# Patient Record
Sex: FEMALE | Race: BLACK OR AFRICAN AMERICAN | Employment: UNEMPLOYED | URBAN - METROPOLITAN AREA
[De-identification: names, ages, dates, MRNs, and addresses within clinical notes are randomized per-mention and may not be internally consistent; named-entity substitution may affect disease eponyms.]

---

## 2022-08-29 ENCOUNTER — HOSPITAL ENCOUNTER (INPATIENT)
Age: 22
LOS: 1 days | Discharge: ANOTHER HOSPITAL | DRG: 561 | End: 2022-08-29
Attending: OBSTETRICS & GYNECOLOGY | Admitting: OBSTETRICS & GYNECOLOGY
Payer: MEDICAID

## 2022-08-29 ENCOUNTER — HOSPITAL ENCOUNTER (EMERGENCY)
Age: 22
Discharge: ANOTHER HOSPITAL | End: 2022-08-29
Attending: EMERGENCY MEDICINE
Payer: MEDICAID

## 2022-08-29 VITALS
DIASTOLIC BLOOD PRESSURE: 58 MMHG | HEART RATE: 91 BPM | HEIGHT: 65 IN | BODY MASS INDEX: 33.32 KG/M2 | RESPIRATION RATE: 18 BRPM | TEMPERATURE: 97.7 F | OXYGEN SATURATION: 100 % | WEIGHT: 200 LBS | SYSTOLIC BLOOD PRESSURE: 111 MMHG

## 2022-08-29 VITALS
OXYGEN SATURATION: 100 % | SYSTOLIC BLOOD PRESSURE: 119 MMHG | DIASTOLIC BLOOD PRESSURE: 72 MMHG | TEMPERATURE: 98.4 F | HEART RATE: 105 BPM | RESPIRATION RATE: 18 BRPM

## 2022-08-29 PROBLEM — Z34.90 PREGNANT: Status: ACTIVE | Noted: 2022-08-29

## 2022-08-29 LAB
ABO + RH BLD: NORMAL
ALBUMIN SERPL-MCNC: 3.4 G/DL (ref 3.5–5)
ALBUMIN/GLOB SERPL: 0.7 {RATIO} (ref 1.1–2.2)
ALP SERPL-CCNC: 159 U/L (ref 45–117)
ALT SERPL-CCNC: 12 U/L (ref 12–78)
ANION GAP SERPL CALC-SCNC: 12 MMOL/L (ref 5–15)
AST SERPL W P-5'-P-CCNC: 13 U/L (ref 15–37)
BASOPHILS # BLD: 0.1 K/UL (ref 0–0.2)
BASOPHILS NFR BLD: 0 % (ref 0–2.5)
BILIRUB SERPL-MCNC: 0.4 MG/DL (ref 0.2–1)
BLOOD GROUP ANTIBODIES SERPL: NEGATIVE
BUN SERPL-MCNC: 4 MG/DL (ref 6–20)
BUN/CREAT SERPL: 6 (ref 12–20)
CA-I BLD-MCNC: 9.1 MG/DL (ref 8.5–10.1)
CHLORIDE SERPL-SCNC: 102 MMOL/L (ref 97–108)
CO2 SERPL-SCNC: 21 MMOL/L (ref 21–32)
CREAT SERPL-MCNC: 0.69 MG/DL (ref 0.55–1.02)
EOSINOPHIL # BLD: 0.1 K/UL (ref 0–0.7)
EOSINOPHIL NFR BLD: 1 % (ref 0.9–2.9)
ERYTHROCYTE [DISTWIDTH] IN BLOOD BY AUTOMATED COUNT: 12.8 % (ref 11.5–14.5)
GLOBULIN SER CALC-MCNC: 4.6 G/DL (ref 2–4)
GLUCOSE SERPL-MCNC: 121 MG/DL (ref 65–100)
HCT VFR BLD AUTO: 37.5 % (ref 36–46)
HGB BLD-MCNC: 12 G/DL (ref 13.5–17.5)
LYMPHOCYTES # BLD: 1.5 K/UL (ref 1–4.8)
LYMPHOCYTES NFR BLD: 7 % (ref 20.5–51.1)
MCH RBC QN AUTO: 26.8 PG (ref 31–34)
MCHC RBC AUTO-ENTMCNC: 32.1 G/DL (ref 31–36)
MCV RBC AUTO: 83.6 FL (ref 80–100)
MONOCYTES # BLD: 1 K/UL (ref 0.2–2.4)
MONOCYTES NFR BLD: 5 % (ref 1.7–9.3)
NEUTS SEG # BLD: 18.4 K/UL (ref 1.8–7.7)
NEUTS SEG NFR BLD: 87 % (ref 42–75)
NRBC # BLD: 0.01 K/UL
NRBC BLD-RTO: 0 PER 100 WBC
PLATELET # BLD AUTO: 416 K/UL (ref 150–400)
PMV BLD AUTO: 7.6 FL (ref 6.5–11.5)
POTASSIUM SERPL-SCNC: 2.9 MMOL/L (ref 3.5–5.1)
PROT SERPL-MCNC: 8 G/DL (ref 6.4–8.2)
RBC # BLD AUTO: 4.49 M/UL (ref 4.5–5.9)
SODIUM SERPL-SCNC: 135 MMOL/L (ref 136–145)
SPECIMEN EXP DATE BLD: NORMAL
WBC # BLD AUTO: 21.1 K/UL (ref 4.4–11.3)

## 2022-08-29 PROCEDURE — 74011250636 HC RX REV CODE- 250/636: Performed by: EMERGENCY MEDICINE

## 2022-08-29 PROCEDURE — 75410000000 HC DELIVERY VAGINAL/SINGLE

## 2022-08-29 PROCEDURE — 74011250636 HC RX REV CODE- 250/636

## 2022-08-29 PROCEDURE — 80053 COMPREHEN METABOLIC PANEL: CPT

## 2022-08-29 PROCEDURE — 65410000002 HC RM PRIVATE OB

## 2022-08-29 PROCEDURE — 99285 EMERGENCY DEPT VISIT HI MDM: CPT

## 2022-08-29 PROCEDURE — 75410000003 HC RECOV DEL/VAG/CSECN EA 0.5 HR

## 2022-08-29 PROCEDURE — 85025 COMPLETE CBC W/AUTO DIFF WBC: CPT

## 2022-08-29 PROCEDURE — 36415 COLL VENOUS BLD VENIPUNCTURE: CPT

## 2022-08-29 PROCEDURE — 99221 1ST HOSP IP/OBS SF/LOW 40: CPT | Performed by: OBSTETRICS & GYNECOLOGY

## 2022-08-29 PROCEDURE — 74011250636 HC RX REV CODE- 250/636: Performed by: OBSTETRICS & GYNECOLOGY

## 2022-08-29 PROCEDURE — 88307 TISSUE EXAM BY PATHOLOGIST: CPT

## 2022-08-29 PROCEDURE — 86900 BLOOD TYPING SEROLOGIC ABO: CPT

## 2022-08-29 RX ORDER — HYDROCORTISONE ACETATE PRAMOXINE HCL 2.5; 1 G/100G; G/100G
CREAM TOPICAL AS NEEDED
Status: DISCONTINUED | OUTPATIENT
Start: 2022-08-29 | End: 2022-08-29 | Stop reason: HOSPADM

## 2022-08-29 RX ORDER — SODIUM CHLORIDE 9 MG/ML
1000 INJECTION, SOLUTION INTRAVENOUS CONTINUOUS
Status: DISCONTINUED | OUTPATIENT
Start: 2022-08-29 | End: 2022-08-29 | Stop reason: HOSPADM

## 2022-08-29 RX ORDER — OXYTOCIN 10 [USP'U]/ML
INJECTION, SOLUTION INTRAMUSCULAR; INTRAVENOUS
Status: COMPLETED
Start: 2022-08-29 | End: 2022-08-29

## 2022-08-29 RX ORDER — NALOXONE HYDROCHLORIDE 0.4 MG/ML
0.4 INJECTION, SOLUTION INTRAMUSCULAR; INTRAVENOUS; SUBCUTANEOUS AS NEEDED
Status: DISCONTINUED | OUTPATIENT
Start: 2022-08-29 | End: 2022-08-29 | Stop reason: HOSPADM

## 2022-08-29 RX ORDER — IBUPROFEN 800 MG/1
800 TABLET ORAL
Status: DISCONTINUED | OUTPATIENT
Start: 2022-08-29 | End: 2022-08-29 | Stop reason: HOSPADM

## 2022-08-29 RX ORDER — OXYTOCIN 10 [USP'U]/ML
10 INJECTION, SOLUTION INTRAMUSCULAR; INTRAVENOUS ONCE
Status: COMPLETED | OUTPATIENT
Start: 2022-08-29 | End: 2022-08-29

## 2022-08-29 RX ORDER — SODIUM CHLORIDE, SODIUM LACTATE, POTASSIUM CHLORIDE, CALCIUM CHLORIDE 600; 310; 30; 20 MG/100ML; MG/100ML; MG/100ML; MG/100ML
125 INJECTION, SOLUTION INTRAVENOUS CONTINUOUS
Status: DISCONTINUED | OUTPATIENT
Start: 2022-08-29 | End: 2022-08-29 | Stop reason: HOSPADM

## 2022-08-29 RX ADMIN — OXYTOCIN 10 UNITS: 10 INJECTION, SOLUTION INTRAMUSCULAR; INTRAVENOUS at 05:43

## 2022-08-29 RX ADMIN — SODIUM CHLORIDE 1000 ML: 9 INJECTION, SOLUTION INTRAVENOUS at 01:25

## 2022-08-29 RX ADMIN — SODIUM CHLORIDE, POTASSIUM CHLORIDE, SODIUM LACTATE AND CALCIUM CHLORIDE 125 ML/HR: 600; 310; 30; 20 INJECTION, SOLUTION INTRAVENOUS at 06:09

## 2022-08-29 NOTE — ED NOTES
Mother here stating she is 33 weeks pregnant, due on 10/16/2022, c/o clear vaginal discharge that was turning pinkish tonight. Patient states having cramplike abd pain that comes and goes, occurring every 5 mins. Dr. Regla Andres notified.

## 2022-08-29 NOTE — ED NOTES
Mother fundus has been massaged x 25 mins. Minimal bleeding at this time. Pericare done, maternity pad and underwear placed at this time. Gown and sheet changed. Patient resting comfortly at this time on stretcher.  Patients mother at bedside

## 2022-08-29 NOTE — ED NOTES
Mother out of room at this time stating \" her water just broke\". Dr. Zohra Dash and Ana Soler RN to room at this time to assess patient.

## 2022-08-29 NOTE — H&P
History & Physical    Name: Avelina Jeffries MRN: [id number]  SSN: [id number]    YOB: 2000  Age: 25 y.o. Sex: female        Subjective:     Estimated Date of Delivery: None noted. OB History    Para Term  AB Living   1 0           SAB IAB Ectopic Molar Multiple Live Births                    # Outcome Date GA Lbr Sarwat/2nd Weight Sex Delivery Anes PTL Lv   1 Current                Ms. Franc Li is admitted with pregnancy at approx 33 weeks s/p delivery at [facility]. Reports contractions early this morning and proceed to the ECA and subsequently delivered via  of  infant. Patient transferred to [address] [place] [address] for continual care. Currently denies pain , lochia normal, fundus firm. Infant initially was to be transferred to [address] [place] [address] but due to transportation now has been accepted and will be transferred to [facility] . Patient to be transferred to be near infant and continual care    Past Medical History:   Diagnosis Date    Asthma      No past surgical history on file. Social History     Occupational History    Not on file   Tobacco Use    Smoking status: Not on file    Smokeless tobacco: Not on file   Substance and Sexual Activity    Alcohol use: Not on file    Drug use: Not on file    Sexual activity: Not on file     No family history on file. Allergies   Allergen Reactions    Red Dye Hives    Shellfish Derived Anaphylaxis    Amoxil [Amoxicillin] Hives     Prior to Admission medications    Medication Sig Start Date End Date Taking? Authorizing Provider   PNV Comb #2-Iron-FA-Omega 3 29-1-400 mg cmpk Take  by mouth. Yes Provider, Historical        Review of Systems: A comprehensive review of systems was negative except for that written in the HPI.     Objective:     Vitals:  Vitals:    22 0643 22 0648 22 0700 22 0730   BP:   127/78 (!) 111/58   Pulse:   (!) 103 91   Resp:   18    Temp:   97.7 °F (36.5 °C)    SpO2: 100% 100%     Weight: Height:            Physical Exam:  Patient without distress. Heart: S1S2 present  Lung: normal respiratory effort  Abdomen: soft, nontender  Fundus: contracted  Perineum intact    Prenatal Labs:   Lab Results   Component Value Date/Time    ABO/Rh(D) O Positive 08/29/2022 01:50 AM         Assessment/Plan:     Active Problems:    Pregnant (8/29/2022)         Plan: Admit for  continual care . Patient to be transferred to [address] [place] [address] .

## 2022-08-29 NOTE — ED TRIAGE NOTES
Patient here at this time with c/o abd cramping on/off, \"just now\" started with pink discharge approx 1 hour ago.  Patient is 33 weeks with due date of 10/16/22

## 2022-08-29 NOTE — PROGRESS NOTES
4164: Assumed care of pt at this time. Pt awaiting transfer to [address] [place] [address], [phone]: Report given to Debbie Dash RN at [facility] L[facility]D.     6787: Dr. Nestor Moncada in room to assess pt. Pt awaiting transport. 0813: [facility] here to transport pt.     0818: Pt taken off unit via stretcher for transport to [facility].

## 2022-08-29 NOTE — PROGRESS NOTES
0330-report called from [facility]. Pt came in to their ER with c/o abd pain and leaking x2days. [id number], Vaginal delivery, infant girl, at 46, without difficulty. Per [facility] nurse, infant apgars were 10/10 and infant is \"doing well\". Per [facility] ER nurse, [facility] LONG TERM, pt was given 1L of NS, no pitocin administered, and fundal massage x 15mins. Nurse transferred to NICU for report on infant. 0440-This RN made aware that infant is unable to transfer to this facility with pt due to not having a NICU provider/nurse able to transfer with infant. Security concerns expressed. Nursing supervisor notified for assistance. [facility] ER charge nurse, Raul Patrick, called by this RN and made aware of my concern with the safety issue regarding mother and infant . ER charge nurse agrees with safety concerns and states that the mother left approx 5-10 mins ago via transport and that she will contact transport to have patient taken back to [facility]. 0450-pt to l&d room 4 via stretcher from [facility] facility in [facility], [place]. Pt post vaginal delivery. Pt mother at bedside for support. Infant not transported with pt. Md aware of pt arrival. Orders taken. Pt aware of plan of care at this time. Pt  inquiring about infant. Pt unaware of plan of care with infant. Pt is made aware that infant is still in [facility], [place] and will be notified when and where infant will be transferred. IVL in Right AC and Right Hand. Sites wnl and patent. No infant associated with this patient delivery summary    0454-transfer center called to desk. Per Transfer center, infant will not be able to be transported to [facility]. Currently waiting on [facility] to accept infant. Transfer center aware that there is a safety issue with pt being transferred without infant. Infant currently at [facility] ER.     0500-pt voiding without difficulty. Egress passed    0505-dr isaac called.  Md aware of pt arrival. SBAR given. 0548-[facility] called. [facility] has accepted [facility]. This RN stressed again that there is a security issue with infant being  from mother. 0550-dr isaac called. Updated on infant being accepted to [facility]. Per dr Sudhakar Wylie, our patient was only accepted if the infant was able to be accepted to our NICU. Transport center to be notified and pt to be transferred to [address] [place] [address] Transport called back and made aware of md concerns. Transport agrees with safety concerns and states that they will attempt to have pt accepted to [facility] and will notify us when/if it is accepted. 0649-transport center called back to desk. Pt accepted to [facility].    0700-dr isaac called and made aware that she will need to come for transfer. Md in route. Pt aware of plan of care. Report given to am nurse.

## 2022-08-29 NOTE — ED PROVIDER NOTES
EMERGENCY DEPARTMENT HISTORY AND PHYSICAL EXAM  ?    Date: 8/29/2022  Patient Name: Ganesh Griffiths    History of Presenting Illness    Patient presents with:  Pregnancy Problem      History Provided By: Patient and Patient's Mother    HPI: Ganesh Griffiths, 25 y.o. female 29 weeks pregnant primigravid with no significant past medical history presents to the ED with cc of labor contractions that started 20 minutes ago, coming every 3 minutes. She reports mild pink vaginal drainage. The contractions are intense. Her OB GYN is Dr. Radha Fleming in [place]. There are no other complaints, changes, or physical findings at this time. PCP: No primary care provider on file. Past History    Past Medical History:  No past medical history on file. Past Surgical History:  No past surgical history on file. Family History:  No family history on file. Social History: Allergies:   -- Amoxil [Amoxicillin] -- Hives      Review of Systems  @[facility]@    Physical Exam  @[facility]@    Diagnostic Study Results    Labs -   No results found for this or any previous visit (from the past 12 hour(s)). Radiologic Studies -   No orders to display  CT Results  (Last 48 hours)    None      CXR Results  (Last 48 hours)    None        Medical Decision Making and ED Course  I am the first provider for this patient. I reviewed the vital signs, available nursing notes, past medical history, past surgical history, family history and social history. Vital Signs-Reviewed the patient's vital signs. Empty flowsheet group. Records Reviewed: Nursing Notes    Provider Notes (Medical Decision Making):   My assessment is expectant delivery. We will keep patient here for delivery. The patient presents with differential diagnosis of labor pain, false labor, appendicitis, uti. ED Course:   Initial assessment performed.  The patients presenting problems have been discussed, and they are in agreement with the care plan formulated and outlined with them. I have encouraged them to ask questions as they arise throughout their visit. Disposition    Transfer to another facility        Diagnosis    Clinical Impression: No diagnosis found. Attestations:    More MD Sherly    Please note that this dictation was completed with [employer], the computer voice recognition software. Quite often unanticipated grammatical, syntax, homophones, and other interpretive errors are inadvertently transcribed by the computer software. Please disregard these errors. Please excuse any errors that have escaped final proofreading. Thank you.    ? No past medical history on file. No past surgical history on file. No family history on file. Social History     Socioeconomic History    Marital status: Not on file     Spouse name: Not on file    Number of children: Not on file    Years of education: Not on file    Highest education level: Not on file   Occupational History    Not on file   Tobacco Use    Smoking status: Not on file    Smokeless tobacco: Not on file   Substance and Sexual Activity    Alcohol use: Not on file    Drug use: Not on file    Sexual activity: Not on file   Other Topics Concern    Not on file   Social History Narrative    Not on file     Social Determinants of Health     Financial Resource Strain: Not on file   Food Insecurity: Not on file   Transportation Needs: Not on file   Physical Activity: Not on file   Stress: Not on file   Social Connections: Not on file   Intimate Partner Violence: Not on file   Housing Stability: Not on file         ALLERGIES: Amoxil [amoxicillin]    Review of Systems   Constitutional: Negative. HENT: Negative. Eyes: Negative. Respiratory: Negative. Cardiovascular: Negative. Gastrointestinal:  Positive for abdominal pain. Endocrine: Negative. Genitourinary:  Positive for vaginal bleeding. Musculoskeletal: Negative.     Skin: Negative. Neurological: Negative. Hematological: Negative. Psychiatric/Behavioral: Negative. There were no vitals filed for this visit. Physical Exam  Vitals and nursing note reviewed. Constitutional:       Appearance: Normal appearance. HENT:      Head: Normocephalic and atraumatic. Right Ear: External ear normal.      Left Ear: External ear normal.      Mouth/Throat:      Mouth: Mucous membranes are moist.      Pharynx: Oropharynx is clear. Eyes:      Extraocular Movements: Extraocular movements intact. Conjunctiva/sclera: Conjunctivae normal.      Pupils: Pupils are equal, round, and reactive to light. Cardiovascular:      Rate and Rhythm: Normal rate and regular rhythm. Pulses: Normal pulses. Heart sounds: Normal heart sounds. Pulmonary:      Effort: Pulmonary effort is normal.      Breath sounds: Normal breath sounds. Abdominal:      General: Abdomen is flat. Bowel sounds are normal.      Palpations: Abdomen is soft. Comments: Gravid abdomen   Genitourinary:     General: Normal vulva. Comments: Baby is in vertex presentation, +6 station, 6-8 cm dilated. Musculoskeletal:         General: Normal range of motion. Cervical back: Normal range of motion and neck supple. Skin:     General: Skin is warm and dry. Neurological:      General: No focal deficit present. Mental Status: She is alert and oriented to person, place, and time. MDM  Risk of Complications, Morbidity, and/or Mortality  General comments: Exam is consistent with 2nd or 3rd stage labor. Delivery is imminent. Patient delivered a female premature baby. Other Procedure    Date/Time: 8/29/2022 1:47 AM  Performed by: Howard Bar MD  Authorized by:  Howard Bar MD

## 2022-08-29 NOTE — ED NOTES
Dr. Lili Ceron at bedside performing vaginal exam with stating that he could feel head in vaginal canal. Patient moved to Room 5 at this time, room set up for vaginal delivery at this time.

## 2022-08-29 NOTE — ED NOTES
Mother delivered placenta at this time. This was examined by Dr. Manpreet Vincent at this time. Placed in lab specimen contianer and labeled with mothers patient labels.

## 2022-08-29 NOTE — PROGRESS NOTES
3883- patient provided warm blankets per request. Patient asked where her baby was being transferred. This RN told patient the plan was for her and baby to be transferred to [facility]. Patient and mother (at bedside) agreeable to plan.

## 2022-11-14 ENCOUNTER — HOSPITAL ENCOUNTER (EMERGENCY)
Age: 22
Discharge: HOME | End: 2022-11-14
Attending: EMERGENCY MEDICINE
Payer: MEDICAID

## 2022-11-14 VITALS
HEART RATE: 99 BPM | BODY MASS INDEX: 29.73 KG/M2 | OXYGEN SATURATION: 99 % | SYSTOLIC BLOOD PRESSURE: 139 MMHG | HEIGHT: 66 IN | RESPIRATION RATE: 18 BRPM | WEIGHT: 185 LBS | DIASTOLIC BLOOD PRESSURE: 92 MMHG | TEMPERATURE: 99.6 F

## 2022-11-14 DIAGNOSIS — R05.9 COUGH, UNSPECIFIED TYPE: ICD-10-CM

## 2022-11-14 DIAGNOSIS — R06.7 SNEEZING: Primary | ICD-10-CM

## 2022-11-14 PROCEDURE — 99283 EMERGENCY DEPT VISIT LOW MDM: CPT

## 2022-11-14 RX ORDER — LORATADINE 10 MG/1
10 TABLET ORAL DAILY
Qty: 7 TABLET | Refills: 0 | Status: SHIPPED | OUTPATIENT
Start: 2022-11-14 | End: 2022-11-21

## 2022-11-14 NOTE — ED PROVIDER NOTES
EMERGENCY DEPARTMENT HISTORY AND PHYSICAL EXAM      Date: 11/14/2022  Patient Name: Mehreen Verdugo    History of Presenting Illness     Chief Complaint   Patient presents with    Nasal Congestion       History Provided By: Patient    HPI: Mehreen Verdugo, 25 y.o. female with no past medical history presenting with 4 days of runny nose, sneezing. Her 3month-old daughter has been sick with similar symptoms. Denies any vomiting, fevers, body aches, shortness of breath. No treatments tried. ,    There are no other complaints, changes, or physical findings at this time. PCP: Sade Duke MD    No current facility-administered medications on file prior to encounter. Current Outpatient Medications on File Prior to Encounter   Medication Sig Dispense Refill    PNV Comb #2-Iron-FA-Omega 3 29-1-400 mg cmpk Take  by mouth. Past History     Past Medical History:  Past Medical History:   Diagnosis Date    Asthma        Past Surgical History:  No past surgical history on file. Family History:  History reviewed. No pertinent family history. Social History: Allergies: Allergies   Allergen Reactions    Red Dye Hives    Shellfish Derived Anaphylaxis    Amoxil [Amoxicillin] Hives       Review of Systems   Review of Systems   Constitutional:  Negative for chills and fever. HENT:  Positive for rhinorrhea and sneezing. Negative for postnasal drip and sore throat. Eyes:  Negative for redness. Respiratory:  Negative for shortness of breath and wheezing. Cardiovascular:  Negative for chest pain. Gastrointestinal:  Negative for abdominal pain, nausea and vomiting. Genitourinary:  Negative for flank pain. Musculoskeletal:  Negative for myalgias. Skin:  Negative for rash. Neurological:  Negative for headaches. Physical Exam   Physical Exam  Vitals and nursing note reviewed. Constitutional:       General: She is not in acute distress.      Appearance: Normal appearance. HENT:      Head: Normocephalic and atraumatic. Mouth/Throat:      Mouth: Mucous membranes are moist.   Eyes:      Extraocular Movements: Extraocular movements intact. Conjunctiva/sclera: Conjunctivae normal.   Cardiovascular:      Rate and Rhythm: Normal rate and regular rhythm. Pulmonary:      Effort: Pulmonary effort is normal. No respiratory distress. Breath sounds: Normal breath sounds. No wheezing. Abdominal:      General: There is no distension. Palpations: Abdomen is soft. Tenderness: There is no abdominal tenderness. Musculoskeletal:         General: Normal range of motion. Cervical back: Normal range of motion. Skin:     General: Skin is warm and dry. Neurological:      General: No focal deficit present. Mental Status: She is alert and oriented to person, place, and time. Mental status is at baseline.     =  Lab and Diagnostic Study Results   Labs -   No results found for this or any previous visit (from the past 12 hour(s)). Radiologic Studies -   @lastxrresult@  CT Results  (Last 48 hours)      None          CXR Results  (Last 48 hours)      None            Medical Decision Making and ED Course   Differential Diagnosis & Medical Decision Making Provider Note:   25year Old female presenting with 2 3 days of cough nose, congestion, sneezing. No contributory medical history. Vital signs are stable. Exam is reassuring. Patient is stable for discharge Discussed symptomatic care, anticipatory guidance, and return precautions. - I am the first provider for this patient. I reviewed the vital signs, available nursing notes, past medical history, past surgical history, family history and social history. The patients presenting problems have been discussed, and they are in agreement with the care plan formulated and outlined with them. I have encouraged them to ask questions as they arise throughout their visit.     Vital Signs-Reviewed the patient's vital signs. Patient Vitals for the past 12 hrs:   Temp Pulse Resp BP SpO2   11/14/22 1113 99.6 °F (37.6 °C) 99 18 (!) 139/92 99 %       ED Course:             Disposition   Disposition: DC- Adult Discharges: All of the diagnostic tests were reviewed and questions answered. Diagnosis, care plan and treatment options were discussed. The patient understands the instructions and will follow up as directed. The patients results have been reviewed with them. They have been counseled regarding their diagnosis. The patient verbally convey understanding and agreement of the signs, symptoms, diagnosis, treatment and prognosis and additionally agrees to follow up as recommended with their PCP in 24 - 48 hours. They also agree with the care-plan and convey that all of their questions have been answered. I have also put together some discharge instructions for them that include: 1) educational information regarding their diagnosis, 2) how to care for their diagnosis at home, as well a 3) list of reasons why they would want to return to the ED prior to their follow-up appointment, should their condition change. DISCHARGE PLAN:  1. Current Discharge Medication List        CONTINUE these medications which have NOT CHANGED    Details   PNV Comb #2-Iron-FA-Omega 3 29-1-400 mg cmpk Take  by mouth. 2.   Follow-up Information       Follow up With Specialties Details Why Contact Info    Mabel Chavira MD [address]  [place]  [phone]            3. Return to ED if worse   4. Discharge Medication List as of 11/14/2022 11:48 AM        START taking these medications    Details   loratadine (Claritin) 10 mg tablet Take 1 Tablet by mouth daily for 7 days. , Normal, Disp-7 Tablet, R-0           CONTINUE these medications which have NOT CHANGED    Details   PNV Comb #2-Iron-FA-Omega 3 29-1-400 mg cmpk Take  by mouth., Historical Med                Diagnosis/Clinical Impression     Clinical Impression:   1. Sneezing    2. Cough, unspecified type        Attestations: Sho Harris MD, am the primary clinician of record. Please note that this dictation was completed with [employer], the computer voice recognition software. Quite often unanticipated grammatical, syntax, homophones, and other interpretive errors are inadvertently transcribed by the computer software. Please disregard these errors. Please excuse any errors that have escaped final proofreading. Thank you.

## 2023-05-25 ENCOUNTER — HOSPITAL ENCOUNTER (EMERGENCY)
Facility: HOSPITAL | Age: 23
Discharge: HOME | End: 2023-05-25
Attending: EMERGENCY MEDICINE
Payer: MEDICAID

## 2023-05-25 ENCOUNTER — APPOINTMENT (OUTPATIENT)
Facility: HOSPITAL | Age: 23
End: 2023-05-25
Payer: MEDICAID

## 2023-05-25 VITALS
BODY MASS INDEX: 32.14 KG/M2 | TEMPERATURE: 97.6 F | HEART RATE: 104 BPM | WEIGHT: 200 LBS | RESPIRATION RATE: 18 BRPM | HEIGHT: 66 IN | SYSTOLIC BLOOD PRESSURE: 135 MMHG | OXYGEN SATURATION: 100 % | DIASTOLIC BLOOD PRESSURE: 86 MMHG

## 2023-05-25 DIAGNOSIS — R07.9 CHEST PAIN, UNSPECIFIED TYPE: Primary | ICD-10-CM

## 2023-05-25 DIAGNOSIS — R06.02 SHORTNESS OF BREATH: ICD-10-CM

## 2023-05-25 DIAGNOSIS — T50.8X5A ALLERGIC REACTION TO CONTRAST DYE, INITIAL ENCOUNTER: ICD-10-CM

## 2023-05-25 LAB
ALBUMIN SERPL-MCNC: 3.9 G/DL (ref 3.5–5)
ALBUMIN/GLOB SERPL: 0.8 (ref 1.1–2.2)
ALP SERPL-CCNC: 103 U/L (ref 45–117)
ALT SERPL-CCNC: 18 U/L (ref 12–78)
ANION GAP SERPL CALC-SCNC: 11 MMOL/L (ref 5–15)
AST SERPL W P-5'-P-CCNC: 14 U/L (ref 15–37)
BASOPHILS # BLD: 0.1 K/UL (ref 0–0.1)
BASOPHILS NFR BLD: 1 % (ref 0–1)
BILIRUB SERPL-MCNC: 0.5 MG/DL (ref 0.2–1)
BUN SERPL-MCNC: 7 MG/DL (ref 6–20)
BUN/CREAT SERPL: 9 (ref 12–20)
CA-I BLD-MCNC: 10.1 MG/DL (ref 8.5–10.1)
CHLORIDE SERPL-SCNC: 104 MMOL/L (ref 97–108)
CO2 SERPL-SCNC: 23 MMOL/L (ref 21–32)
CREAT SERPL-MCNC: 0.82 MG/DL (ref 0.55–1.02)
D DIMER PPP FEU-MCNC: 0.63 UG/ML(FEU)
DIFFERENTIAL METHOD BLD: ABNORMAL
EOSINOPHIL # BLD: 0.5 K/UL (ref 0–0.4)
EOSINOPHIL NFR BLD: 5 % (ref 0–7)
ERYTHROCYTE [DISTWIDTH] IN BLOOD BY AUTOMATED COUNT: 12 % (ref 11.5–14.5)
GLOBULIN SER CALC-MCNC: 4.6 G/DL (ref 2–4)
GLUCOSE SERPL-MCNC: 91 MG/DL (ref 65–100)
HCG UR QL: NEGATIVE
HCT VFR BLD AUTO: 42.7 % (ref 35–47)
HGB BLD-MCNC: 13.7 G/DL (ref 11.5–16)
IMM GRANULOCYTES # BLD AUTO: 0 K/UL (ref 0–0.04)
IMM GRANULOCYTES NFR BLD AUTO: 0 % (ref 0–0.5)
LYMPHOCYTES # BLD: 2.7 K/UL (ref 0.8–3.5)
LYMPHOCYTES NFR BLD: 30 % (ref 12–49)
MCH RBC QN AUTO: 26.7 PG (ref 26–34)
MCHC RBC AUTO-ENTMCNC: 32.1 G/DL (ref 30–36.5)
MCV RBC AUTO: 83.1 FL (ref 80–99)
MONOCYTES # BLD: 0.4 K/UL (ref 0–1)
MONOCYTES NFR BLD: 5 % (ref 5–13)
NEUTS SEG # BLD: 5.4 K/UL (ref 1.8–8)
NEUTS SEG NFR BLD: 59 % (ref 32–75)
NRBC # BLD: 0 K/UL (ref 0–0.01)
NRBC BLD-RTO: 0 PER 100 WBC
PLATELET # BLD AUTO: 472 K/UL (ref 150–400)
PMV BLD AUTO: 9 FL (ref 8.9–12.9)
POTASSIUM SERPL-SCNC: 3.8 MMOL/L (ref 3.5–5.1)
PROT SERPL-MCNC: 8.5 G/DL (ref 6.4–8.2)
RBC # BLD AUTO: 5.14 M/UL (ref 3.8–5.2)
SODIUM SERPL-SCNC: 138 MMOL/L (ref 136–145)
TROPONIN I SERPL HS-MCNC: <4 NG/L (ref 0–51)
WBC # BLD AUTO: 9.1 K/UL (ref 3.6–11)

## 2023-05-25 PROCEDURE — 6360000002 HC RX W HCPCS: Performed by: EMERGENCY MEDICINE

## 2023-05-25 PROCEDURE — 2580000003 HC RX 258

## 2023-05-25 PROCEDURE — 85379 FIBRIN DEGRADATION QUANT: CPT

## 2023-05-25 PROCEDURE — 96376 TX/PRO/DX INJ SAME DRUG ADON: CPT

## 2023-05-25 PROCEDURE — 2580000003 HC RX 258: Performed by: EMERGENCY MEDICINE

## 2023-05-25 PROCEDURE — 6370000000 HC RX 637 (ALT 250 FOR IP): Performed by: EMERGENCY MEDICINE

## 2023-05-25 PROCEDURE — 80053 COMPREHEN METABOLIC PANEL: CPT

## 2023-05-25 PROCEDURE — 84484 ASSAY OF TROPONIN QUANT: CPT

## 2023-05-25 PROCEDURE — 96375 TX/PRO/DX INJ NEW DRUG ADDON: CPT

## 2023-05-25 PROCEDURE — 85025 COMPLETE CBC W/AUTO DIFF WBC: CPT

## 2023-05-25 PROCEDURE — 2500000003 HC RX 250 WO HCPCS: Performed by: EMERGENCY MEDICINE

## 2023-05-25 PROCEDURE — 81025 URINE PREGNANCY TEST: CPT

## 2023-05-25 PROCEDURE — 93005 ELECTROCARDIOGRAM TRACING: CPT | Performed by: EMERGENCY MEDICINE

## 2023-05-25 PROCEDURE — 96374 THER/PROPH/DIAG INJ IV PUSH: CPT

## 2023-05-25 PROCEDURE — 99285 EMERGENCY DEPT VISIT HI MDM: CPT

## 2023-05-25 PROCEDURE — 71275 CT ANGIOGRAPHY CHEST: CPT

## 2023-05-25 PROCEDURE — A4216 STERILE WATER/SALINE, 10 ML: HCPCS | Performed by: EMERGENCY MEDICINE

## 2023-05-25 PROCEDURE — 6360000004 HC RX CONTRAST MEDICATION: Performed by: EMERGENCY MEDICINE

## 2023-05-25 RX ORDER — PREDNISOLONE SODIUM PHOSPHATE 15 MG/5ML
60 SOLUTION ORAL
Status: COMPLETED | OUTPATIENT
Start: 2023-05-25 | End: 2023-05-25

## 2023-05-25 RX ORDER — METHYLPREDNISOLONE SODIUM SUCCINATE 125 MG/2ML
125 INJECTION, POWDER, LYOPHILIZED, FOR SOLUTION INTRAMUSCULAR; INTRAVENOUS ONCE
Status: COMPLETED | OUTPATIENT
Start: 2023-05-25 | End: 2023-05-25

## 2023-05-25 RX ORDER — PREDNISOLONE SODIUM PHOSPHATE 15 MG/5ML
40 SOLUTION ORAL DAILY
Qty: 39.9 ML | Refills: 0 | Status: SHIPPED | OUTPATIENT
Start: 2023-05-25 | End: 2023-05-28

## 2023-05-25 RX ORDER — DIPHENHYDRAMINE HYDROCHLORIDE 50 MG/ML
25 INJECTION INTRAMUSCULAR; INTRAVENOUS
Status: COMPLETED | OUTPATIENT
Start: 2023-05-25 | End: 2023-05-25

## 2023-05-25 RX ORDER — WATER 1000 ML/1000ML
INJECTION, SOLUTION INTRAVENOUS
Status: COMPLETED
Start: 2023-05-25 | End: 2023-05-25

## 2023-05-25 RX ADMIN — Medication 60 MG: at 18:54

## 2023-05-25 RX ADMIN — DIPHENHYDRAMINE HYDROCHLORIDE 25 MG: 50 INJECTION, SOLUTION INTRAMUSCULAR; INTRAVENOUS at 18:16

## 2023-05-25 RX ADMIN — DIPHENHYDRAMINE HYDROCHLORIDE 25 MG: 50 INJECTION, SOLUTION INTRAMUSCULAR; INTRAVENOUS at 17:41

## 2023-05-25 RX ADMIN — WATER 10 ML: 1 INJECTION INTRAMUSCULAR; INTRAVENOUS; SUBCUTANEOUS at 17:41

## 2023-05-25 RX ADMIN — IOPAMIDOL 100 ML: 755 INJECTION, SOLUTION INTRAVENOUS at 17:59

## 2023-05-25 RX ADMIN — METHYLPREDNISOLONE SODIUM SUCCINATE 125 MG: 125 INJECTION INTRAMUSCULAR; INTRAVENOUS at 17:40

## 2023-05-25 RX ADMIN — FAMOTIDINE 20 MG: 10 INJECTION, SOLUTION INTRAVENOUS at 18:41

## 2023-05-25 ASSESSMENT — PAIN SCALES - GENERAL: PAINLEVEL_OUTOF10: 7

## 2023-05-25 ASSESSMENT — PAIN - FUNCTIONAL ASSESSMENT: PAIN_FUNCTIONAL_ASSESSMENT: 0-10

## 2023-05-25 ASSESSMENT — PAIN DESCRIPTION - LOCATION: LOCATION: CHEST

## 2023-05-25 NOTE — ED TRIAGE NOTES
Pt reports Sob, elevated BP, and chest pain that began today. Pain is 7/10. Pt not hypertensive in triage.

## 2023-05-25 NOTE — ED NOTES
Awaiting pepcid to be verified by pharmacy.       [facility]  05/25/23 8469 lower your elbows down and behind your back. You will feel your shoulder blades slide down and together, and at the same time you will feel a stretch across your chest and the front of your shoulders. 4. Hold for about 6 seconds, and then relax for up to 10 seconds. 5. Repeat 8 to 12 times. Strengthening: Hands on head   1. Move your head backward, forward, and side to side against gentle pressure from your hands, holding each position for about 6 seconds. 2. Repeat 8 to 12 times. Follow-up care is a key part of your treatment and safety. Be sure to make and go to all appointments, and call your doctor if you are having problems. It's also a good idea to know your test results and keep a list of the medicines you take. Where can you learn more? Go to https://[email or web address]. org and sign in to your [facility] account. Enter P975 in the MiniMonos box to learn more about \"Neck: Exercises. \"     If you do not have an account, please click on the \"Sign Up Now\" link. Current as of: March 2, 2020               Content Version: 12.5  © 9474-6076 Healthwise, Incorporated. Care instructions adapted under license by [facility] ([facility]). If you have questions about a medical condition or this instruction, always ask your healthcare professional. [address] any warranty or liability for your use of this information.

## 2023-05-25 NOTE — ED NOTES
Hives noted to face. Airway patent. Dr Launie Pallas aware and orders given.      Odilon Kohler RN  05/25/23 0752

## 2023-05-25 NOTE — DISCHARGE INSTRUCTIONS
Thank you! Thank you for allowing me to care for you in the emergency department. It is my goal to provide you with excellent care. If you have not received excellent quality care, please ask to speak to the nurse manager. Please fill out the survey that will come to you by mail or email since we listen to your feedback! Below you will find a list of your tests from today's visit. Should you have any questions, please do not hesitate to call the emergency department.     Labs  Recent Results (from the past 12 hour(s))   Pregnancy, Urine    Collection Time: 05/25/23  1:00 PM   Result Value Ref Range    HCG(Urine) Pregnancy Test Negative Negative     EKG 12 Lead    Collection Time: 05/25/23  1:35 PM   Result Value Ref Range    Ventricular Rate 102 BPM    Atrial Rate 103 BPM    P-R Interval 135 ms    QRS Duration 71 ms    Q-T Interval 354 ms    QTc Calculation (Bazett) 462 ms    P Axis 25 degrees    R Axis 44 degrees    T Axis 8 degrees    Diagnosis       Sinus tachycardia  Borderline T wave abnormalities     CBC with Auto Differential    Collection Time: 05/25/23  2:55 PM   Result Value Ref Range    WBC 9.1 3.6 - 11.0 K/uL    RBC 5.14 3.80 - 5.20 M/uL    Hemoglobin 13.7 11.5 - 16.0 g/dL    Hematocrit 42.7 35.0 - 47.0 %    MCV 83.1 80.0 - 99.0 FL    MCH 26.7 26.0 - 34.0 PG    MCHC 32.1 30.0 - 36.5 g/dL    RDW 12.0 11.5 - 14.5 %    Platelets 090 (H) 875 - 400 K/uL    MPV 9.0 8.9 - 12.9 FL    Nucleated RBCs 0.0 0.0  WBC    nRBC 0.00 0.00 - 0.01 K/uL    Neutrophils % 59 32 - 75 %    Lymphocytes % 30 12 - 49 %    Monocytes % 5 5 - 13 %    Eosinophils % 5 0 - 7 %    Basophils % 1 0 - 1 %    Immature Granulocytes 0 0 - 0.5 %    Neutrophils Absolute 5.4 1.8 - 8.0 K/UL    Lymphocytes Absolute 2.7 0.8 - 3.5 K/UL    Monocytes Absolute 0.4 0.0 - 1.0 K/UL    Eosinophils Absolute 0.5 (H) 0.0 - 0.4 K/UL    Basophils Absolute 0.1 0.0 - 0.1 K/UL    Absolute Immature Granulocyte 0.0 0.00 - 0.04 K/UL    Differential

## 2023-05-25 NOTE — ED PROVIDER NOTES
[facility] EMERGENCY DEPT  EMERGENCY DEPARTMENT HISTORY AND PHYSICAL EXAM      Date: 5/25/2023  Patient Name: Devan Luna  MRN: [id number]  [place] 2000  Date of evaluation: 5/25/2023  Provider: Syl Ramos MD   Note Started: 6:50 PM EDT 5/25/23    HISTORY OF PRESENT ILLNESS     Chief Complaint   Patient presents with    Chest Pain       History Provided By: Patient    HPI: Devan Luna is a 26 y.o. female with asthma presents ambulatory to ED with complaints of right sided chest pain and SOB for 2-3 days. It feels diff than her asthma. She is on birth control but otherwise denies recent trips or travel, recent immobility or known coagulopathy. She denies leg pain or swelling. She feels SOB at times at rest but usually with activity. She denies cough, congestion, or URI symptoms. PAST MEDICAL HISTORY   Past Medical History:  Past Medical History:   Diagnosis Date    Asthma        Past Surgical History:  History reviewed. No pertinent surgical history. Family History:  History reviewed. No pertinent family history. Social History: Allergies: Allergies   Allergen Reactions    Red Dye Hives    Shellfish Allergy Anaphylaxis    Amoxicillin Hives       PCP: Terri Renee MD    Current Meds:   Current Facility-Administered Medications   Medication Dose Route Frequency Provider Last Rate Last Admin    prednisoLONE (ORAPRED) 15 MG/5ML solution 60 mg  60 mg Oral NOW Syl Ramos MD         No current outpatient medications on file.        Social Determinants of Health:   Social Determinants of Health     Tobacco Use: Not on file   Alcohol Use: Not on file   Financial Resource Strain: Not on file   Food Insecurity: Not on file   Transportation Needs: Not on file   Physical Activity: Not on file   Stress: Not on file   Social Connections: Not on file   Intimate Partner Violence: Not on file   Depression: Not on file   Housing Stability: Not on file       PHYSICAL EXAM   Physical

## 2023-05-26 LAB
EKG ATRIAL RATE: 103 BPM
EKG DIAGNOSIS: NORMAL
EKG P AXIS: 25 DEGREES
EKG P-R INTERVAL: 135 MS
EKG Q-T INTERVAL: 354 MS
EKG QRS DURATION: 71 MS
EKG QTC CALCULATION (BAZETT): 462 MS
EKG R AXIS: 44 DEGREES
EKG T AXIS: 8 DEGREES
EKG VENTRICULAR RATE: 102 BPM

## 2023-05-26 NOTE — ED NOTES
Patient stable at time of discharge. Reviewed discharge instructions and education. Patient verbalized understanding. Patient states no questions at this time.       Joe Hodges RN  05/25/23 2000

## 2023-11-05 ENCOUNTER — HOSPITAL ENCOUNTER (EMERGENCY)
Facility: HOSPITAL | Age: 23
Discharge: HOME | End: 2023-11-05
Attending: EMERGENCY MEDICINE
Payer: MEDICAID

## 2023-11-05 VITALS
DIASTOLIC BLOOD PRESSURE: 82 MMHG | TEMPERATURE: 98.1 F | HEIGHT: 66 IN | HEART RATE: 96 BPM | BODY MASS INDEX: 32.14 KG/M2 | RESPIRATION RATE: 18 BRPM | WEIGHT: 200 LBS | OXYGEN SATURATION: 100 % | SYSTOLIC BLOOD PRESSURE: 142 MMHG

## 2023-11-05 DIAGNOSIS — L02.91 ABSCESS: Primary | ICD-10-CM

## 2023-11-05 DIAGNOSIS — L30.8 OTHER ECZEMA: ICD-10-CM

## 2023-11-05 PROCEDURE — 99283 EMERGENCY DEPT VISIT LOW MDM: CPT

## 2023-11-05 RX ORDER — TRIAMCINOLONE ACETONIDE 1 MG/G
OINTMENT TOPICAL 2 TIMES DAILY
Qty: 1 EACH | Refills: 0 | Status: SHIPPED | OUTPATIENT
Start: 2023-11-05

## 2023-11-05 RX ORDER — DOXYCYCLINE HYCLATE 100 MG
100 TABLET ORAL 2 TIMES DAILY
Qty: 14 TABLET | Refills: 0 | Status: SHIPPED | OUTPATIENT
Start: 2023-11-05 | End: 2023-11-12

## 2023-11-05 NOTE — ED TRIAGE NOTES
Pt reported a abscess in her rt axially area for the last 2 days, pt also with c/o rash on left arm x 2 weeks that is not going away with her normal meds

## 2024-01-13 ENCOUNTER — HOSPITAL ENCOUNTER (EMERGENCY)
Facility: HOSPITAL | Age: 24
Discharge: HOME | End: 2024-01-13
Attending: STUDENT IN AN ORGANIZED HEALTH CARE EDUCATION/TRAINING PROGRAM
Payer: MEDICAID

## 2024-01-13 VITALS
RESPIRATION RATE: 17 BRPM | TEMPERATURE: 98.9 F | WEIGHT: 195 LBS | OXYGEN SATURATION: 100 % | DIASTOLIC BLOOD PRESSURE: 86 MMHG | SYSTOLIC BLOOD PRESSURE: 136 MMHG | HEART RATE: 81 BPM | BODY MASS INDEX: 31.34 KG/M2 | HEIGHT: 66 IN

## 2024-01-13 DIAGNOSIS — G44.209 TENSION HEADACHE: Primary | ICD-10-CM

## 2024-01-13 LAB
FLUAV RNA SPEC QL NAA+PROBE: NOT DETECTED
FLUBV RNA SPEC QL NAA+PROBE: NOT DETECTED
SARS-COV-2 RNA RESP QL NAA+PROBE: NOT DETECTED

## 2024-01-13 PROCEDURE — 99283 EMERGENCY DEPT VISIT LOW MDM: CPT

## 2024-01-13 PROCEDURE — 6370000000 HC RX 637 (ALT 250 FOR IP): Performed by: STUDENT IN AN ORGANIZED HEALTH CARE EDUCATION/TRAINING PROGRAM

## 2024-01-13 PROCEDURE — 87636 SARSCOV2 & INF A&B AMP PRB: CPT

## 2024-01-13 RX ORDER — IBUPROFEN 400 MG/1
400 TABLET ORAL EVERY 6 HOURS PRN
Qty: 120 TABLET | Refills: 0 | Status: SHIPPED | OUTPATIENT
Start: 2024-01-13

## 2024-01-13 RX ORDER — IBUPROFEN 600 MG/1
600 TABLET ORAL
Status: COMPLETED | OUTPATIENT
Start: 2024-01-13 | End: 2024-01-13

## 2024-01-13 RX ADMIN — IBUPROFEN 600 MG: 600 TABLET, FILM COATED ORAL at 13:51

## 2024-01-13 NOTE — DISCHARGE INSTRUCTIONS
Thank you!  Thank you for allowing me to care for you in the emergency department. It is my goal to provide you with excellent care. If you have not received excellent quality care, please ask to speak to the nurse manager. Please fill out the survey that will come to you by mail or email since we listen to your feedback!     Below you will find a list of your tests from today's visit.  Should you have any questions, please do not hesitate to call the emergency department.    Labs  Recent Results (from the past 12 hour(s))   COVID-19 & Influenza Combo    Collection Time: 01/13/24 12:53 PM    Specimen: Nasopharyngeal   Result Value Ref Range    SARS-CoV-2, PCR Not Detected Not Detected      Rapid Influenza A By PCR Not Detected Not Detected      Rapid Influenza B By PCR Not Detected Not Detected         Radiologic Studies  No orders to display     ------------------------------------------------------------------------------------------------------------  The exam and treatment you received in the Emergency Department were for an urgent problem and are not intended as complete care. It is important that you follow-up with a doctor, nurse practitioner, or physician assistant to:  (1) confirm your diagnosis,  (2) re-evaluation of changes in your illness and treatment, and  (3) for ongoing care. Please take your discharge instructions with you when you go to your follow-up appointment.     If you have any problem arranging a follow-up appointment, contact the Emergency Department.  If your symptoms become worse or you do not improve as expected and you are unable to reach your health care provider, please return to the Emergency Department. We are available 24 hours a day.     If a prescription has been provided, please have it filled as soon as possible to prevent a delay in treatment. If you have any questions or reservations about taking the medication due to side effects or interactions with other medications,  please call your primary care provider or contact the ER.

## 2024-01-15 NOTE — ED PROVIDER NOTES
[facility] EMERGENCY DEPT  EMERGENCY DEPARTMENT HISTORY AND PHYSICAL EXAM      Date: 1/13/2024  Patient Name: Darci Jean-Baptiste  MRN: [id number]  YOB: 2000  Date of evaluation: 1/13/2024  Provider: Ravin Ross MD   Note Started: 8:45 PM EST 1/14/24    HISTORY OF PRESENT ILLNESS     Chief Complaint   Patient presents with    Headache       History Provided By: Patient    HPI: Darci Jean-Baptiste is a 23 y.o. female with history of asthma who presents with headache.  She reports 5 days of frontal, intermittent, throbbing headache.  Started while at rest.  Since being in the ED has improved.  She denies any fevers, neck pain, confusion, or neurodeficits.    PAST MEDICAL HISTORY   Past Medical History:  Past Medical History:   Diagnosis Date    Asthma        Past Surgical History:  History reviewed. No pertinent surgical history.    Family History:  History reviewed. No pertinent family history.    Social History:       Allergies:  Allergies   Allergen Reactions    Red Dye Hives    Shellfish Allergy Anaphylaxis    Amoxicillin Hives       PCP: Dany Duque MD    Current Meds:   No current facility-administered medications for this encounter.     Current Outpatient Medications   Medication Sig Dispense Refill    ibuprofen (IBU) 400 MG tablet Take 1 tablet by mouth every 6 hours as needed for Pain 120 tablet 0    triamcinolone (KENALOG) 0.1 % ointment Apply topically 2 times daily Apply topically 2 times daily. 1 each 0       Social Determinants of Health:   Social Determinants of Health     Tobacco Use: Not on file   Alcohol Use: Not on file   Financial Resource Strain: Not on file   Food Insecurity: Not on file   Transportation Needs: Not on file   Physical Activity: Not on file   Stress: Not on file   Social Connections: Not on file   Intimate Partner Violence: Not on file   Depression: Not on file   Housing Stability: Not on file   Interpersonal Safety: Not on file   Utilities: Not on file

## 2025-01-02 ENCOUNTER — HOSPITAL ENCOUNTER (EMERGENCY)
Facility: HOSPITAL | Age: 25
Discharge: HOME | End: 2025-01-02
Attending: EMERGENCY MEDICINE
Payer: MEDICAID

## 2025-01-02 VITALS
BODY MASS INDEX: 36.22 KG/M2 | RESPIRATION RATE: 20 BRPM | DIASTOLIC BLOOD PRESSURE: 96 MMHG | TEMPERATURE: 98.2 F | HEIGHT: 66 IN | HEART RATE: 110 BPM | OXYGEN SATURATION: 99 % | WEIGHT: 225.4 LBS | SYSTOLIC BLOOD PRESSURE: 161 MMHG

## 2025-01-02 DIAGNOSIS — J06.9 VIRAL URI WITH COUGH: ICD-10-CM

## 2025-01-02 DIAGNOSIS — J45.21 MILD INTERMITTENT ASTHMA WITH EXACERBATION: Primary | ICD-10-CM

## 2025-01-02 LAB
FLUAV RNA SPEC QL NAA+PROBE: NOT DETECTED
FLUBV RNA SPEC QL NAA+PROBE: NOT DETECTED
RSV BY NAA: NOT DETECTED
SARS-COV-2 RNA RESP QL NAA+PROBE: NOT DETECTED
SPECIMEN SOURCE: NORMAL

## 2025-01-02 PROCEDURE — 87634 RSV DNA/RNA AMP PROBE: CPT

## 2025-01-02 PROCEDURE — 99283 EMERGENCY DEPT VISIT LOW MDM: CPT

## 2025-01-02 PROCEDURE — 87636 SARSCOV2 & INF A&B AMP PRB: CPT

## 2025-01-02 PROCEDURE — 6370000000 HC RX 637 (ALT 250 FOR IP): Performed by: EMERGENCY MEDICINE

## 2025-01-02 PROCEDURE — 94640 AIRWAY INHALATION TREATMENT: CPT

## 2025-01-02 RX ORDER — ALBUTEROL SULFATE 90 UG/1
2 INHALANT RESPIRATORY (INHALATION) ONCE
Status: COMPLETED | OUTPATIENT
Start: 2025-01-02 | End: 2025-01-02

## 2025-01-02 RX ORDER — IBUPROFEN 400 MG/1
400 TABLET, FILM COATED ORAL
Status: COMPLETED | OUTPATIENT
Start: 2025-01-02 | End: 2025-01-02

## 2025-01-02 RX ORDER — PREDNISONE INTENSOL 5 MG/ML
40 SOLUTION, CONCENTRATE ORAL DAILY
Qty: 32 ML | Refills: 0 | Status: SHIPPED | OUTPATIENT
Start: 2025-01-03 | End: 2025-01-07

## 2025-01-02 RX ORDER — ALBUTEROL SULFATE 90 UG/1
2 INHALANT RESPIRATORY (INHALATION) 4 TIMES DAILY PRN
Qty: 54 G | Refills: 1 | Status: SHIPPED | OUTPATIENT
Start: 2025-01-02

## 2025-01-02 RX ORDER — PREDNISONE 20 MG/1
40 TABLET ORAL DAILY
Qty: 8 TABLET | Refills: 0 | Status: SHIPPED | OUTPATIENT
Start: 2025-01-03 | End: 2025-01-02 | Stop reason: ALTCHOICE

## 2025-01-02 RX ORDER — PREDNISONE 20 MG/1
60 TABLET ORAL ONCE
Status: COMPLETED | OUTPATIENT
Start: 2025-01-02 | End: 2025-01-02

## 2025-01-02 RX ADMIN — Medication 2 PUFF: at 08:57

## 2025-01-02 RX ADMIN — IBUPROFEN 400 MG: 400 TABLET, FILM COATED ORAL at 08:26

## 2025-01-02 RX ADMIN — PREDNISONE 60 MG: 20 TABLET ORAL at 08:26

## 2025-01-02 ASSESSMENT — PAIN SCALES - GENERAL: PAINLEVEL_OUTOF10: 0

## 2025-01-02 ASSESSMENT — PAIN - FUNCTIONAL ASSESSMENT: PAIN_FUNCTIONAL_ASSESSMENT: 0-10

## 2025-01-02 NOTE — ED PROVIDER NOTES
Brief Patient Assessment:    I conducted a brief primary assessment of the patient's presenting complaints, vital signs, and clinical condition prior to the shift change to ensure patient safety, condition stability, and timely initiation of the necessary workup. Any clinically indicated orders were placed to facilitate medical management. The incoming provider will assume full responsibility for the ongoing care of the patient upon their arrival for their emergency department shift and will conduct a detailed and comprehensive assessment which will be documented in the main ED encounter note.    Pt became ill 12/31. Daughter with similar symptoms. Sneezing, coughing, chest congestion. Mostly nasal congestion. No fevers, nausea, vomiting.       Dany Tomas MD  1/2/2025         Dany Tomas MD  01/02/25 0828

## 2025-01-02 NOTE — DISCHARGE INSTRUCTIONS
You were seen in the ER for your COVID/Flu-like symptoms. Your swabs for COVID, Flu, and RSV were negative. You should act as though you are positive even if you have had negative swabs. Thankfully, your vital signs are reassuring, including your oxygen and your heart rate.     We also noted wheezing when listening to your lungs, so we think you are having a mild asthma exacerbation. We gave you some prednisone to reduce the inflammation in your lungs, and you should use the albuterol inhaler for any difficulty breathing. This prednisone and albuterol should also help with your cough.    You should take tylenol or ibuprofen for fever, aches and pains for the next few days. You can alternate these every 3 hours so that you always have something on board. For instance, you can take tylenol at 9am, ibuprofen at noon, tylenol again at 3pm and ibuprofen again at 6pm. Take in plenty of water to stay hydrated and follow up with your primary care doctor in the next few days. Return to the ER for any uncontrollable vomiting or diarrhea, difficulty breathing, or any other new or concerning symptoms.        Thank you for choosing our Emergency Department for your care.  It is our privilege to care for you in your time of need.  In the next several days, you may receive a survey via email or mailed to your home about your experience with our team.  We would greatly appreciate you taking a few minutes to complete the survey, as we use this information to learn what we have done well and what we could be doing better. Thank you for trusting us with your care!    Below you will find a list of your tests from today's visit.   Labs and Radiology Studies  Recent Results (from the past 12 hour(s))   Respiratory Syncytial Virus, Molecular (Restricted: peds pts or suitable admitted adults)    Collection Time: 01/02/25  7:57 AM    Specimen: Nasopharyngeal   Result Value Ref Range    Source Nasopharyngeal      RSV by NAAT Not Detected Not  Detected     COVID-19 & Influenza Combo    Collection Time: 01/02/25  7:57 AM    Specimen: Nasopharyngeal   Result Value Ref Range    SARS-CoV-2, PCR Not Detected Not Detected      Rapid Influenza A By PCR Not Detected Not Detected      Rapid Influenza B By PCR Not Detected Not Detected       No results found.  ------------------------------------------------------------------------------------------------------------  The evaluation and treatment you received in the Emergency Department were for an urgent problem. It is important that you follow-up with a doctor, nurse practitioner, or physician assistant to:  (1) confirm your diagnosis,  (2) re-evaluation of changes in your illness and treatment, and (3) for ongoing care. Please take your discharge instructions with you when you go to your follow-up appointment.     If you have any problem arranging a follow-up appointment, contact us!  If your symptoms become worse or you do not improve as expected, please return to us. We are available 24 hours a day.     If a prescription has been provided, please fill it as soon as possible to prevent a delay in treatment. If you have any questions or reservations about taking the medication due to side effects or interactions with other medications, please call your primary care provider or contact us directly.  Again, THANK YOU for choosing us to care for YOU!     negative...

## 2025-01-02 NOTE — ED PROVIDER NOTES
sneezing c/w viral syndrome causing mild asthma exacerbatio. Well-appearing, vitals reassuring, will give prednisone and albuterol here and ibuprofen. Same for home. Return precautions given.        Records Reviewed (source and summary of external notes): Prior medical records and Nursing notes.    Vitals:    Vitals:    01/02/25 0737 01/02/25 0738   BP:  (!) 161/96   Pulse: (!) 110    Resp: 20    Temp: 98.2 °F (36.8 °C)    SpO2: 99%    Weight: 102.2 kg (225 lb 6.4 oz)    Height: 1.676 m (5' 6\")         ED COURSE  ED Course as of 01/02/25 0854   Thu Jan 02, 2025   0840 RSV by NAAT: Not Detected [YA]   0852 SARS-CoV-2, PCR: Not Detected [YA]   0852 Rapid Influenza A By PCR: Not Detected [YA]   0852 Rapid Influenza B By PCR: Not Detected [YA]      ED Course User Index  [YA] Mariela Gonsalves MD     Patient was given the following medications:  Medications   albuterol sulfate HFA (PROVENTIL;VENTOLIN;PROAIR) 108 (90 Base) MCG/ACT inhaler 2 puff (has no administration in time range)   ibuprofen (ADVIL;MOTRIN) tablet 400 mg (400 mg Oral Given 1/2/25 0826)   predniSONE (DELTASONE) tablet 60 mg (60 mg Oral Given 1/2/25 0826)       CONSULTS: See ED Course/MDM for further details.         PROCEDURES   Unless otherwise noted above, none      CRITICAL CARE TIME        ED IMPRESSION     1. Mild intermittent asthma with exacerbation    2. Viral URI with cough          DISPOSITION/PLAN   Discharge     PATIENT REFERRED TO:  Dany Duque MD  [address]  [facility] [address] [phone]    In 1 week          DISCHARGE MEDICATIONS:     Medication List        START taking these medications      albuterol sulfate  (90 Base) MCG/ACT inhaler  Commonly known as: Ventolin HFA  Inhale 2 puffs into the lungs 4 times daily as needed for Wheezing     predniSONE 20 MG tablet  Commonly known as: DELTASONE  Take 2 tablets by mouth daily for 4 days  Start taking on: January 3, 2025            ASK your doctor about these medications       ibuprofen 400 MG tablet  Commonly known as: IBU  Take 1 tablet by mouth every 6 hours as needed for Pain     triamcinolone 0.1 % ointment  Commonly known as: KENALOG  Apply topically 2 times daily Apply topically 2 times daily.               Where to Get Your Medications        These medications were sent to [facility]/pharmacy #[id number] - [place], [place] - [address] [place] [address] -  [phone] - F [phone] [address] [facility] [address]      Phone: [phone]   albuterol sulfate  (90 Base) MCG/ACT inhaler  predniSONE 20 MG tablet           DISCONTINUED MEDICATIONS:  Current Discharge Medication List          I am the Primary Clinician of Record. Mariela Gonsalves MD (electronically signed)    (Please note that parts of this dictation were completed with voice recognition software. Quite often unanticipated grammatical, syntax, homophones, and other interpretive errors are inadvertently transcribed by the computer software. Please disregards these errors. Please excuse any errors that have escaped final proofreading.)     Mariela Gonsalves MD  01/02/25 2542

## 2025-07-19 ENCOUNTER — APPOINTMENT (OUTPATIENT)
Facility: HOSPITAL | Age: 25
End: 2025-07-19
Payer: MEDICAID

## 2025-07-19 ENCOUNTER — HOSPITAL ENCOUNTER (EMERGENCY)
Facility: HOSPITAL | Age: 25
Discharge: HOME | End: 2025-07-19
Payer: MEDICAID

## 2025-07-19 VITALS
DIASTOLIC BLOOD PRESSURE: 100 MMHG | TEMPERATURE: 98.6 F | BODY MASS INDEX: 35.8 KG/M2 | SYSTOLIC BLOOD PRESSURE: 172 MMHG | HEART RATE: 95 BPM | WEIGHT: 221.8 LBS | RESPIRATION RATE: 20 BRPM | OXYGEN SATURATION: 98 %

## 2025-07-19 DIAGNOSIS — R07.9 CHEST PAIN, UNSPECIFIED TYPE: Primary | ICD-10-CM

## 2025-07-19 DIAGNOSIS — J45.909 UNCOMPLICATED ASTHMA, UNSPECIFIED ASTHMA SEVERITY, UNSPECIFIED WHETHER PERSISTENT: ICD-10-CM

## 2025-07-19 LAB
ANION GAP SERPL CALC-SCNC: 12 MMOL/L (ref 2–12)
APPEARANCE UR: CLEAR
BACTERIA URNS QL MICRO: NEGATIVE /HPF
BASOPHILS # BLD: 0.05 K/UL (ref 0–0.1)
BASOPHILS NFR BLD: 0.5 % (ref 0–1)
BILIRUB UR QL: NEGATIVE
BUN SERPL-MCNC: 9 MG/DL (ref 6–20)
BUN/CREAT SERPL: 12 (ref 12–20)
CA-I BLD-MCNC: 9.9 MG/DL (ref 8.5–10.1)
CHLORIDE SERPL-SCNC: 103 MMOL/L (ref 97–108)
CO2 SERPL-SCNC: 23 MMOL/L (ref 21–32)
COLOR UR: ABNORMAL
CREAT SERPL-MCNC: 0.74 MG/DL (ref 0.55–1.02)
DIFFERENTIAL METHOD BLD: ABNORMAL
EKG ATRIAL RATE: 105 BPM
EKG DIAGNOSIS: NORMAL
EKG P AXIS: 30 DEGREES
EKG P-R INTERVAL: 151 MS
EKG Q-T INTERVAL: 344 MS
EKG QRS DURATION: 75 MS
EKG QTC CALCULATION (BAZETT): 455 MS
EKG R AXIS: 33 DEGREES
EKG T AXIS: 8 DEGREES
EKG VENTRICULAR RATE: 105 BPM
EOSINOPHIL # BLD: 0.84 K/UL (ref 0–0.4)
EOSINOPHIL NFR BLD: 8.1 % (ref 0–7)
ERYTHROCYTE [DISTWIDTH] IN BLOOD BY AUTOMATED COUNT: 12.1 % (ref 11.5–14.5)
GLUCOSE SERPL-MCNC: 99 MG/DL (ref 65–100)
GLUCOSE UR STRIP.AUTO-MCNC: NEGATIVE MG/DL
HCG UR QL: NEGATIVE
HCT VFR BLD AUTO: 39.3 % (ref 35–47)
HGB BLD-MCNC: 12.6 G/DL (ref 11.5–16)
HGB UR QL STRIP: ABNORMAL
IMM GRANULOCYTES # BLD AUTO: 0.15 K/UL (ref 0–0.04)
IMM GRANULOCYTES NFR BLD AUTO: 1.5 % (ref 0–0.5)
KETONES UR QL STRIP.AUTO: NEGATIVE MG/DL
LEUKOCYTE ESTERASE UR QL STRIP.AUTO: NEGATIVE
LYMPHOCYTES # BLD: 3.86 K/UL (ref 0.8–3.5)
LYMPHOCYTES NFR BLD: 37.4 % (ref 12–49)
MCH RBC QN AUTO: 26.4 PG (ref 26–34)
MCHC RBC AUTO-ENTMCNC: 32.1 G/DL (ref 30–36.5)
MCV RBC AUTO: 82.2 FL (ref 80–99)
MONOCYTES # BLD: 0.66 K/UL (ref 0–1)
MONOCYTES NFR BLD: 6.4 % (ref 5–13)
NEUTS SEG # BLD: 4.75 K/UL (ref 1.8–8)
NEUTS SEG NFR BLD: 46.1 % (ref 32–75)
NITRITE UR QL STRIP.AUTO: NEGATIVE
NRBC # BLD: 0 K/UL (ref 0–0.01)
NRBC BLD-RTO: 0 PER 100 WBC
PH UR STRIP: 6.5 (ref 5–8)
PLATELET # BLD AUTO: 424 K/UL (ref 150–400)
PMV BLD AUTO: 9.4 FL (ref 8.9–12.9)
POTASSIUM SERPL-SCNC: 4.6 MMOL/L (ref 3.5–5.1)
PROT UR STRIP-MCNC: NEGATIVE MG/DL
RBC # BLD AUTO: 4.78 M/UL (ref 3.8–5.2)
RBC #/AREA URNS HPF: ABNORMAL /HPF (ref 0–3)
SODIUM SERPL-SCNC: 138 MMOL/L (ref 136–145)
SP GR UR REFRACTOMETRY: <1.005 (ref 1–1.03)
TROPONIN I SERPL HS-MCNC: 4 NG/L (ref 0–51)
URINE CULTURE IF INDICATED: ABNORMAL
UROBILINOGEN UR QL STRIP.AUTO: 0.2 EU/DL (ref 0.2–1)
WBC # BLD AUTO: 10.3 K/UL (ref 3.6–11)
WBC URNS QL MICRO: ABNORMAL /HPF (ref 0–5)

## 2025-07-19 PROCEDURE — 96374 THER/PROPH/DIAG INJ IV PUSH: CPT

## 2025-07-19 PROCEDURE — 80048 BASIC METABOLIC PNL TOTAL CA: CPT

## 2025-07-19 PROCEDURE — 81001 URINALYSIS AUTO W/SCOPE: CPT

## 2025-07-19 PROCEDURE — 6360000002 HC RX W HCPCS

## 2025-07-19 PROCEDURE — 93005 ELECTROCARDIOGRAM TRACING: CPT

## 2025-07-19 PROCEDURE — 94640 AIRWAY INHALATION TREATMENT: CPT

## 2025-07-19 PROCEDURE — 84484 ASSAY OF TROPONIN QUANT: CPT

## 2025-07-19 PROCEDURE — 99285 EMERGENCY DEPT VISIT HI MDM: CPT

## 2025-07-19 PROCEDURE — 81025 URINE PREGNANCY TEST: CPT

## 2025-07-19 PROCEDURE — 6370000000 HC RX 637 (ALT 250 FOR IP)

## 2025-07-19 PROCEDURE — 71045 X-RAY EXAM CHEST 1 VIEW: CPT

## 2025-07-19 PROCEDURE — 85025 COMPLETE CBC W/AUTO DIFF WBC: CPT

## 2025-07-19 RX ORDER — KETOROLAC TROMETHAMINE 15 MG/ML
15 INJECTION, SOLUTION INTRAMUSCULAR; INTRAVENOUS
Status: COMPLETED | OUTPATIENT
Start: 2025-07-19 | End: 2025-07-19

## 2025-07-19 RX ORDER — IPRATROPIUM BROMIDE AND ALBUTEROL SULFATE 2.5; .5 MG/3ML; MG/3ML
1 SOLUTION RESPIRATORY (INHALATION)
Status: COMPLETED | OUTPATIENT
Start: 2025-07-19 | End: 2025-07-19

## 2025-07-19 RX ADMIN — IPRATROPIUM BROMIDE AND ALBUTEROL SULFATE 1 DOSE: .5; 2.5 SOLUTION RESPIRATORY (INHALATION) at 23:16

## 2025-07-19 RX ADMIN — KETOROLAC TROMETHAMINE 15 MG: 15 INJECTION, SOLUTION INTRAMUSCULAR; INTRAVENOUS at 22:28

## 2025-07-19 ASSESSMENT — LIFESTYLE VARIABLES
HOW MANY STANDARD DRINKS CONTAINING ALCOHOL DO YOU HAVE ON A TYPICAL DAY: PATIENT DOES NOT DRINK
HOW OFTEN DO YOU HAVE A DRINK CONTAINING ALCOHOL: NEVER

## 2025-07-20 NOTE — DISCHARGE INSTRUCTIONS
Thank you for choosing our Emergency Department for your care.  It is our privilege to care for you in your time of need.  In the next several days, you may receive a survey via email or mailed to your home about your experience with our team.  We would greatly appreciate you taking a few minutes to complete the survey, as we use this information to learn what we have done well and what we could be doing better. Thank you for trusting us with your care!    Below you will find a list of your tests from today's visit.   Labs and Radiology Studies  Recent Results (from the past 12 hours)   Urinalysis with Reflex to Culture    Collection Time: 07/19/25  9:20 PM    Specimen: Urine   Result Value Ref Range    Color, UA Yellow/Straw      Appearance Clear Clear      Specific Gravity, UA <1.005 1.003 - 1.030    pH, Urine 6.5 5.0 - 8.0      Protein, UA Negative Negative mg/dL    Glucose, Ur Negative Negative mg/dL    Ketones, Urine Negative Negative mg/dL    Bilirubin, Urine Negative Negative      Blood, Urine Trace (A) Negative      Urobilinogen, Urine 0.2 0.2 - 1.0 EU/dL    Nitrite, Urine Negative Negative      Leukocyte Esterase, Urine Negative Negative      WBC, UA 0-4 0 - 5 /hpf    RBC, UA 0-5 0 - 3 /hpf    BACTERIA, URINE Negative Negative /hpf    Urine Culture if Indicated Culture not indicated by UA result Culture not indicated by UA result     Pregnancy, Urine    Collection Time: 07/19/25  9:20 PM   Result Value Ref Range    Pregnancy, Urine Negative Negative     CBC with Auto Differential    Collection Time: 07/19/25 10:24 PM   Result Value Ref Range    WBC 10.3 3.6 - 11.0 K/uL    RBC 4.78 3.80 - 5.20 M/uL    Hemoglobin 12.6 11.5 - 16.0 g/dL    Hematocrit 39.3 35.0 - 47.0 %    MCV 82.2 80.0 - 99.0 FL    MCH 26.4 26.0 - 34.0 PG    MCHC 32.1 30.0 - 36.5 g/dL    RDW 12.1 11.5 - 14.5 %    Platelets 424 (H) 150 - 400 K/uL    MPV 9.4 8.9 - 12.9 FL    Nucleated RBCs 0.0 0.0  WBC    nRBC 0.00 0.00 - 0.01 K/uL

## 2025-07-20 NOTE — ED PROVIDER NOTES
Time: 07/19/25 10:24 PM   Result Value Ref Range    Troponin, High Sensitivity 4 0 - 51 ng/L   EKG 12 Lead (Chest Pain)    Collection Time: 07/19/25 10:29 PM   Result Value Ref Range    Ventricular Rate 105 BPM    Atrial Rate 105 BPM    P-R Interval 151 ms    QRS Duration 75 ms    Q-T Interval 344 ms    QTc Calculation (Bazett) 455 ms    P Axis 30 degrees    R Axis 33 degrees    T Axis 8 degrees    Diagnosis Sinus tachycardia        EKG:.See ED Course Below    Radiologic Studies:  Radiographic images are visualized and preliminarily interpreted by the ED Provider with the following findings: Xray Interpreted by me.  Shows clear lung, no consolidation, no ptx.     Interpretation per the Radiologist below, if available at the time of this note:  XR CHEST PORTABLE   Final Result      No acute process on portable chest.         Electronically signed by Adrian Aguilar         ED COURSE and DIFFERENTIAL DIAGNOSIS/MDM   Differential and Considerations of tests not ordered: 26 yo F presenting with chest pain. Will obtain CBC to evaluate for hematologic abnormalities, leukocytosis/infection, anemia; CMP to evaluate for electrolyte abnormalities, metabolic abnormalities, renal or hepatic dysfunction; troponin and ECG to evaluate for ACS, arrhythmia, pericarditis, myocarditis, chest xray to evaluate for pneumonia, pneumothorax. Considered CTA chest however I do not suspect aortic dissection or PE; pt is PERC negative. Will treat with duoneb, toradol.     Labs reviewed and interpreted by me, no abnormalities noted. Pt feeling better after duonebs. Advised pt that BP is fairly high today, recommended PCP follow up. Discussed findings and return precautions.       Records Reviewed: Prior medical records and Nursing notes. See ED Course for summary.   Vitals:    Vitals:    07/19/25 2047 07/19/25 2049 07/19/25 2316   BP: (!) 172/100     Pulse: 95     Resp: 20     Temp: 98.6 °F (37 °C)     SpO2: 97%  98%   Weight:  100.6 kg (221 lb

## 2025-07-21 LAB
EKG ATRIAL RATE: 105 BPM
EKG DIAGNOSIS: NORMAL
EKG P AXIS: 30 DEGREES
EKG P-R INTERVAL: 151 MS
EKG Q-T INTERVAL: 344 MS
EKG QRS DURATION: 75 MS
EKG QTC CALCULATION (BAZETT): 455 MS
EKG R AXIS: 33 DEGREES
EKG T AXIS: 8 DEGREES
EKG VENTRICULAR RATE: 105 BPM

## 2025-07-21 PROCEDURE — 93010 ELECTROCARDIOGRAM REPORT: CPT | Performed by: SPECIALIST
